# Patient Record
Sex: FEMALE | Race: WHITE
[De-identification: names, ages, dates, MRNs, and addresses within clinical notes are randomized per-mention and may not be internally consistent; named-entity substitution may affect disease eponyms.]

---

## 2019-01-15 ENCOUNTER — HOSPITAL ENCOUNTER (OUTPATIENT)
Dept: HOSPITAL 59 - SUR | Age: 59
LOS: 1 days | Discharge: HOME HEALTH | End: 2019-01-16
Attending: ORTHOPAEDIC SURGERY
Payer: COMMERCIAL

## 2019-01-15 DIAGNOSIS — M17.12: Primary | ICD-10-CM

## 2019-01-15 DIAGNOSIS — J45.909: ICD-10-CM

## 2019-01-15 LAB
ABO GROUP: (no result)
ANTIBODY SCREEN: NEGATIVE
RH TYPE: POSITIVE

## 2019-01-15 PROCEDURE — 86901 BLOOD TYPING SEROLOGIC RH(D): CPT

## 2019-01-15 PROCEDURE — 27447 TOTAL KNEE ARTHROPLASTY: CPT

## 2019-01-15 PROCEDURE — 01402 ANES OPN/ARTH TOT KNE ARTHRP: CPT

## 2019-01-15 PROCEDURE — 85014 HEMATOCRIT: CPT

## 2019-01-15 PROCEDURE — 90686 IIV4 VACC NO PRSV 0.5 ML IM: CPT

## 2019-01-15 PROCEDURE — 85018 HEMOGLOBIN: CPT

## 2019-01-15 PROCEDURE — 97530 THERAPEUTIC ACTIVITIES: CPT

## 2019-01-15 PROCEDURE — 80048 BASIC METABOLIC PNL TOTAL CA: CPT

## 2019-01-15 PROCEDURE — 76942 ECHO GUIDE FOR BIOPSY: CPT

## 2019-01-15 PROCEDURE — 64447 NJX AA&/STRD FEMORAL NRV IMG: CPT

## 2019-01-15 PROCEDURE — 86900 BLOOD TYPING SEROLOGIC ABO: CPT

## 2019-01-15 PROCEDURE — 86850 RBC ANTIBODY SCREEN: CPT

## 2019-01-15 RX ADMIN — DEXTROSE AND SODIUM CHLORIDE SCH MLS/HR: 5; .9 INJECTION, SOLUTION INTRAVENOUS at 21:18

## 2019-01-15 RX ADMIN — CITALOPRAM HYDROBROMIDE SCH MG: 20 TABLET ORAL at 11:56

## 2019-01-15 RX ADMIN — CEFAZOLIN SODIUM SCH MLS/HR: 2 SOLUTION INTRAVENOUS at 17:02

## 2019-01-15 RX ADMIN — DEXTROSE AND SODIUM CHLORIDE SCH: 5; .9 INJECTION, SOLUTION INTRAVENOUS at 19:32

## 2019-01-15 RX ADMIN — HYDROCODONE BITARTRATE AND ACETAMINOPHEN PRN EACH: 325; 10 TABLET ORAL at 12:47

## 2019-01-15 RX ADMIN — PANTOPRAZOLE SODIUM SCH MG: 40 TABLET, DELAYED RELEASE ORAL at 11:56

## 2019-01-15 RX ADMIN — DOCUSATE SODIUM SCH MG: 100 TABLET, FILM COATED ORAL at 21:18

## 2019-01-15 RX ADMIN — DOCUSATE SODIUM SCH: 100 TABLET, FILM COATED ORAL at 11:38

## 2019-01-15 RX ADMIN — FERROUS SULFATE TAB 325 MG (65 MG ELEMENTAL FE) SCH: 325 (65 FE) TAB at 11:38

## 2019-01-15 NOTE — REHAB EVALUATION
Patient Information





- Patient Information


Diagnosis: L knee OA


Ordered Treatment: PT Evaluate and Treat


Status: Initial Evaluation


Surgery: Yes (L TKA)


Date of Surgery: 01/15/19


Past Medical/Surgical Hx: 


 PAST MEDICAL/SURGICAL HISTORY





Past Surgical History            c scope


                                 left knee scope


                                 tonsils





PMH - Respiratory





Hx Respiratory Disorders         Yes


Hx Asthma                        Yes: uses inhaler usually once a day


Comment:                         snores





PMH - Cardiovascular





Hx Cardiovascular Disorders      Yes


Hx Irregular Heartbeat           Yes: occassionally R/T MVP


Hx Heart Murmur                  Yes


Hx of Mitral Valve Prolapse      Yes


Exercise Tolerance               Good





PMH - Neuro





Hx Neurological Disorders        Yes


Hx Dizziness                     Yes: sometimes when standing up





PMH - GI





Hx Gastrointestinal Disorders    Yes


Hx Gastroesophageal Reflux       Yes: on meds good control





PMH - 





Hx Genitourinary Disorders       No


Hx Age of Menopause              54





PMH - Endocrine





Hx Endocrine Disorders           No





PMH - Musculoskeletal





Hx Musculoskeletal Disorders     Yes


Hx Arthritis                     Yes: left knee





PMH - Psych





Hx Psychiatric Problems          Yes


Hx Depression                    Yes





PMH - Hematology/Oncology





Hx Hematology/Oncology           No


Disorders                        








Premorbid Status: Detail (Prior to surgery the patient was independent with all 

mobility.)


Social History: Detail (The patient lives with spouse in a 2 story house with 1 

step, a platform and 1 step at the enterance.  The patient will not be using 

the second story after surgery. The bathroom is equipped with a tub/shower 

combination with a hand held shower and a standard toilet. No grab bars are 

present in the bathroom. The patient has a front wheeled walker, 4 pronged cane 

and a shower bench.)


Precautions: Universal, Fall, Other (WBAT on the L LE)





- Time With Patient


Total Time Spent With Patient (Min): 30


Treatment Procedures: Detail (Initial Evaluation)





Subjective Information





- Subjective Information


Per Patient (The patient has complaints of L knee pain level 3 at the highest.)





Objective Data





- Mental Status


Patient Orientation: Oriented x3





- Visual Perception


Appears within normal limits for therapeutic activities





- ROM


Not within normal limits (The patient's L knee AROM was limited as to be 

expected following surgery. All other AROM was WNL.)





- Strength/Tone


Not within normal limits (The patient's L LE strength was not tested secondary 

to surgery, however was functional ie: pt was able to complete a SLR. The 

patient's R LE strength was 5/5.)





- Bed Mobility


Independent (The patient was independent with supine to and from sit transfer.)





- Transfers


Independent (The patient was independent with sit to and from stand transfer.)





- Balance


Balance Sitting: Good


Balance Standing: Good





- Sensation


Intact





- Gait


Detail (The patient ambulated with a front wheeled walker WBAT on the L LE a 

distance of 80 x 1 with independently/ supervision for safety only)





Therapy Assessment





- Therapy Assessment


Detail (The patient was independent with ambulation, bed mobility and 

transfers. Feel the patient will progress well in PT.)





Problem List





- Problem List


Physical Therapy Problem List: Detail (Decreased L knee AROM s/p surgery and 

Decreased L LE strength.)





Goals





- Goals


Physical Therapy Goals: 1)The patient will be independent with TKA HEP.  2) The 

patient will ambulate on stairs using proper technique with supervision for 

safety.





Prognosis





- Prognosis


Good





Plan





- Plan


Physical Therapy Plan: PT 1-2 sessions for instruction in HEP and gait training 

on the stairs.

## 2019-01-16 LAB
ANION GAP SERPL CALC-SCNC: 11 MMOL/L (ref 7–16)
BUN SERPL-MCNC: 11 MG/DL (ref 6–20)
CO2 SERPL-SCNC: 26 MMOL/L (ref 22–29)
CREAT SERPL-MCNC: 0.6 MG/DL (ref 0.5–0.9)
EST GLOMERULAR FILTRATION RATE: > 60 ML/MIN
GLUCOSE SERPL-MCNC: 99 MG/DL (ref 74–109)
HCT VFR BLD CALC: 33.9 % (ref 35–47)
HGB BLD-MCNC: 10.6 GM/DL (ref 11.6–16)

## 2019-01-16 RX ADMIN — FERROUS SULFATE TAB 325 MG (65 MG ELEMENTAL FE) SCH MG: 325 (65 FE) TAB at 09:53

## 2019-01-16 RX ADMIN — CITALOPRAM HYDROBROMIDE SCH: 20 TABLET ORAL at 09:53

## 2019-01-16 RX ADMIN — HYDROCODONE BITARTRATE AND ACETAMINOPHEN PRN EACH: 325; 10 TABLET ORAL at 05:41

## 2019-01-16 RX ADMIN — DEXTROSE AND SODIUM CHLORIDE SCH: 5; .9 INJECTION, SOLUTION INTRAVENOUS at 05:03

## 2019-01-16 RX ADMIN — PANTOPRAZOLE SODIUM SCH MG: 40 TABLET, DELAYED RELEASE ORAL at 06:04

## 2019-01-16 RX ADMIN — HYDROCODONE BITARTRATE AND ACETAMINOPHEN PRN EACH: 325; 10 TABLET ORAL at 11:38

## 2019-01-16 RX ADMIN — DEXTROSE AND SODIUM CHLORIDE SCH: 5; .9 INJECTION, SOLUTION INTRAVENOUS at 11:40

## 2019-01-16 RX ADMIN — DOCUSATE SODIUM SCH MG: 100 TABLET, FILM COATED ORAL at 09:53

## 2019-01-16 RX ADMIN — CEFAZOLIN SODIUM SCH MLS/HR: 2 SOLUTION INTRAVENOUS at 00:17

## 2019-01-16 RX ADMIN — HYDROCODONE BITARTRATE AND ACETAMINOPHEN PRN EACH: 325; 10 TABLET ORAL at 00:16

## 2019-01-16 RX ADMIN — CEFAZOLIN SODIUM SCH MLS/HR: 2 SOLUTION INTRAVENOUS at 07:17

## 2019-01-16 NOTE — OPERATIVE NOTE
DATE OF SURGERY:  01/15/2019



Surgeon:  Bandar Sandoval M.D.



PREOPERATIVE DIAGNOSIS:  Endstage arthrosis of the left knee. 



POSTOPERATIVE DIAGNOSIS:  Endstage arthrosis of the left knee. 



OPERATION:  Cemented left total knee arthroplasty using [employer] Kat II components, with a 
size 5 Oxinium femur, a size 5 stem tibia base plate, an 11 mm lipped highly cross-linked tibial 
insert, and a 32 mm all plastic patella. 



Anesthesia:  Spinal. 



PREPARATION:  Chloraprep.



INDIVIDUAL CONSIDERATIONS:  None. 



PROCEDURE:   The patient was taken to the operating room and placed supine on the operating room 
table.   She had a successful induction with spinal anesthetic.   Her left lower extremity was 
prepped and draped in the usual fashion.  



The patient had a midline approach to the knee.  The limb was elevated, the tourniquet was inflated 
to 250 mmHg.  Sharp dissection carried down through the skin and subcutaneous tissue, small veins 
were coagulated with the Bovie.  A medial arthrotomy was performed, the patella was everted, the 
knee was flexed.  The patient had exposed bone in the medial and patellofemoral compartments.  The 
fat pad was resected, the ACL remnants were sacrificed.  Provisional anterior meniscectomies were 
performed, and the capsule was released from the medial proximal tibia.  The initial femoral  
hole was then made free hand.  The intramedullary femoral cutting jig was placed.  It was cut in 7.0 
degrees of valgus and adjusted for rotation, secured with pins for a 10 mm resection.  The initial 
transverse cut was then made.   A skid guide was placed in the anterior and posterior  holes.  
It was found that a size 5 would be appropriate.  The anterior and posterior cuts followed by 
chamfer cuts were made.  Osteophytes removed and a size 5 trial was placed and found to fit well.  



The tibia was brought forward and the remainder of the meniscal remnants were removed with a Bovie.  
 The extraarticular tibial cutting jig was placed.  It was cut in neutral with a 3 degree AP slope.  
Care was taken to adjust for rotation and flexion using the extraarticular alignment guide and bony 
landmarks.  It was set for a 9 mm resection, keyed off the high lateral side and secured with pins.  
When cutting the tibia, care was taken to preserve the PCL insertion on the tibia.  



After making the cut, I removed the osteophytes primarily medially and found that a size 5 baseplate 
trial fit appropriately, after adjusting rotation and secured with pins, I found that an 11 mm 
implant would be appropriate.  The femoral  holes were impacted and the tri-flange tibial keel 
stamp was impacted and these trial components removed.  



The patient had a thick patella, and roughly 9 mm of bone was removed free hand.  I could easily fit 
a 32 mm diameter patella and the 3  holes were drilled.   



The tourniquet was let down briefly to get bleeders posteriorly and placed back up again.  The knee 
was then thoroughly irrigated out with pulsatile Betadine and saline to remove any visual or 
palpable debris.  The bony surfaces were then dried.  A size 5 stem tibia baseplate was cemented 
into place, followed by impaction of the 11 mm lipped highly cross-linked tibial insert, followed by 
cementing in the size 5 Oxinium femur, followed by cementing in the 32 mm all plastic patella.  The 
implant surfaces were compressed, excess cement was removed, and after the cement had set, there was 
excellent motion and stability, ligamentous balance, rotation alignment, and patellofemoral tracking 
were normal.  No lateral release was required.  



After irrigation again, the tourniquet was let down, hemostasis was obtained with a Bovie.  The 
capsule and periosteum and skin and subcutaneous tissue were infiltrated with 30 mL of 0.5% Marcaine 
with epinephrine.  The capsule was then closed with running 2 Quill, the subq was closed in layers 
of running 0 Quill, the skin was closed with staples.  The patient did receive 1 gram of tranexamic 
acid IV preop.  I mixed 1 gram of tranexamic acid with 30 mL of saline, injected it into the knee 
through a sterile 18 gauge needle and a sterile bulky compression BHAVNA type dressing was applied.  



The patient tolerated the procedure well.



The needle and sponge counts were correct.   Estimated blood loss was minimal.



She was taken back to recovery in good condition.  There were no complications. 
YESENIA

## 2019-01-16 NOTE — PHYSICAL THERAPY TX NOTE
Physical Therapy Tx Note





- Treatment Note


Tolerated: Good


Total Time Spent With Patient: 20


Physical Therapy Tx Note: Detail (The patient was in bed when PT arrived and 

feeling "soreness" in  L quad.  The patient completed the following TKA 

exercises: heel slides supine and seated,SLR, ankle pumps, quad sets, gluteal 

sets and hamstring sets. The patient ambulated with front wheeled walker  a 

distance of 80 feet x 1, WBAT on the L LE . The patient ambulated on stairs 

with use of one railing and folded walker using proper technique with 

supervision for safety only. The patient has met all inpatient Pt goals and is 

discharged from inpatient PT. The patient is to receive Home PT services.)


Physical Therapy Problem List: Detail (Decreased L knee AROM s/p surgery and 

Decreased L LE strength.)


Physical Therapy Goals: 1)The patient will be independent with TKA HEP (Goal Met

).  2) The patient will ambulate on stairs using proper technique with 

supervision for safety. (Goal Met)


Physical Therapy Plan: The patient has met all inpatient PT goals and is 

discharged from inpatient PT.

## 2019-01-16 NOTE — REHAB EVALUATION
Patient Information





- Patient Information


Diagnosis: L knee OA


Ordered Treatment: OT Evaluate and Treat


Status: Initial Evaluation


Surgery: Yes (L TKA)


Date of Surgery: 01/15/19


Past Medical/Surgical Hx: 


 PAST MEDICAL/SURGICAL HISTORY





Past Surgical History            c scope


                                 left knee scope


                                 tonsils





PMH - Respiratory





Hx Respiratory Disorders         Yes


Hx Asthma                        Yes: uses inhaler usually once a day


Comment:                         snores





PMH - Cardiovascular





Hx Cardiovascular Disorders      Yes


Hx Irregular Heartbeat           Yes: occassionally R/T MVP


Hx Heart Murmur                  Yes


Hx of Mitral Valve Prolapse      Yes


Exercise Tolerance               Good





PMH - Neuro





Hx Neurological Disorders        Yes


Hx Dizziness                     Yes: sometimes when standing up





PMH - GI





Hx Gastrointestinal Disorders    Yes


Hx Gastroesophageal Reflux       Yes: on meds good control





PMH - 





Hx Genitourinary Disorders       No


Hx Age of Menopause              54





PMH - Endocrine





Hx Endocrine Disorders           No





PMH - Musculoskeletal





Hx Musculoskeletal Disorders     Yes


Hx Arthritis                     Yes: left knee





PMH - Psych





Hx Psychiatric Problems          Yes


Hx Depression                    Yes





PMH - Hematology/Oncology





Hx Hematology/Oncology           No


Disorders                        








Premorbid Status: Detail (Prior to surgery the patient was independent with all 

mobility, meal prep, laundry, home mgmt, some yard work and driving.)


Social History: Detail (The patient lives with spouse in a 2 story house with 1 

step, a platform and 1 step at the entrance.  The patient will not be using the 

second story after surgery. The bathroom is equipped with a tub/shower 

combination with a hand held shower and a standard toilet. No grab bars are 

present in the bathroom. The patient has a front wheeled walker, quad cane and 

a shower bench.)


Precautions: Universal, Fall, Other (WBAT on the L LE)





- Time With Patient


Total Time Spent With Patient (Min): 35


Treatment Procedures: Detail (OT eval low complexity)





Subjective Information





- Subjective Information


Per Patient





Objective Data





- Pain


Pain Present: Yes (4/10)





- Mental Status


Patient Orientation: Oriented x3





- Visual Perception


Appears within normal limits for therapeutic activities





- ROM


Within normal limits (Ismael UE AROM WNL)





- Strength/Tone


Within normal limits (Ismael UE strength WNL)





- Coordination


Appears within normal limits for therapeutic activities





- Bed Mobility


Independent (Ind with supine to sit and sit to supine.)





- Transfers


Independent (Ind with sit to stand from EOB)





- Balance


Balance Sitting: Good


Balance Standing: Good





- Sensation


Intact





- Gait


Detail (Pt ambulating in room with 2 wheeled walker Indly.)





- ADL's/IADL's


Detail (Pt educated and able to demonstrate learning of modified LE dressing 

techniques including doffing slipper socks and donning isaac sock, socks, pants 

and tennis shoes.  Pt educated re: kitchen and shower safety and modifications, 

pt verbalized understanding.)





Therapy Assessment





- Therapy Assessment


Detail (Pt is Ind with modified LE dressing techniques.)





Problem List





- Problem List


Physical Therapy Problem List: Detail (Decreased L knee AROM s/p surgery and 

Decreased L LE strength.)


Occupational Therapy Problem List: Detail (No current IP OT problems identified.

)





Goals





- Goals


Physical Therapy Goals: 1)The patient will be independent with TKA HEP (Goal Met

).  2) The patient will ambulate on stairs using proper technique with 

supervision for safety. (Goal Met)


Occupational Therapy Goals: No current OT goals identified.





Prognosis





- Prognosis


Good





Plan





- Plan


Physical Therapy Plan: The patient has met all inpatient PT goals and is 

discharged from inpatient PT.


Occupational Therapy Plan: No further IP OT recommended.  Thank you for this 

referral.